# Patient Record
Sex: MALE
[De-identification: names, ages, dates, MRNs, and addresses within clinical notes are randomized per-mention and may not be internally consistent; named-entity substitution may affect disease eponyms.]

---

## 2022-07-20 ENCOUNTER — DASHBOARD ENCOUNTERS (OUTPATIENT)
Age: 37
End: 2022-07-20

## 2022-07-20 ENCOUNTER — LAB OUTSIDE AN ENCOUNTER (OUTPATIENT)
Dept: URBAN - METROPOLITAN AREA CLINIC 111 | Facility: CLINIC | Age: 37
End: 2022-07-20

## 2022-07-20 ENCOUNTER — OFFICE VISIT (OUTPATIENT)
Dept: URBAN - METROPOLITAN AREA CLINIC 111 | Facility: CLINIC | Age: 37
End: 2022-07-20
Payer: SELF-PAY

## 2022-07-20 VITALS
BODY MASS INDEX: 30.98 KG/M2 | DIASTOLIC BLOOD PRESSURE: 80 MMHG | HEART RATE: 53 BPM | WEIGHT: 192.8 LBS | HEIGHT: 66 IN | TEMPERATURE: 97.8 F | SYSTOLIC BLOOD PRESSURE: 122 MMHG

## 2022-07-20 DIAGNOSIS — K59.00 CONSTIPATION: ICD-10-CM

## 2022-07-20 DIAGNOSIS — E66.9 OBESITY (BMI 30.0-34.9): ICD-10-CM

## 2022-07-20 DIAGNOSIS — R19.4 CHANGE IN BOWEL HABITS: ICD-10-CM

## 2022-07-20 DIAGNOSIS — R10.13 DYSPEPSIA: ICD-10-CM

## 2022-07-20 DIAGNOSIS — R12 HEARTBURN: ICD-10-CM

## 2022-07-20 DIAGNOSIS — R14.0 ABDOMINAL BLOATING: ICD-10-CM

## 2022-07-20 DIAGNOSIS — K62.5 RECTAL BLEEDING: ICD-10-CM

## 2022-07-20 PROBLEM — 14760008 CONSTIPATION: Status: ACTIVE | Noted: 2022-07-20

## 2022-07-20 PROBLEM — 414916001 OBESITY: Status: ACTIVE | Noted: 2022-07-20

## 2022-07-20 PROCEDURE — 99204 OFFICE O/P NEW MOD 45 MIN: CPT | Performed by: INTERNAL MEDICINE

## 2022-07-20 NOTE — HPI-TODAY'S VISIT:
- 36 yo  female from [place], self-referred for further evaluation of GI complaints as detailed below, which have been ongoing for approximately 5 years - Has chronic constipation which has gotten worse.  He has bowel movements about 4-5 times a day but they are small in amount and he has to strain a lot.  He also has a sensation of incomplete evacuation.  These symptoms increase his bloating.   - Has rectal bleeding 1-2 times per week, which he attributes to hemorrhoids  - Patient has never had a colonoscopy in the past - Denies hematochezia, change in bowel habits, or weight loss - Denies family history of GI malignancies in any first degree relatives - Notes frequent upper GI symptoms of heartburn, regurgitation and abdominal bloating.  These occur several times per month.  He takes [place]rie[place] 2-3 times per month, which helps. - Does not take ASA or NSAIDs

## 2025-05-12 NOTE — PHYSICAL EXAM NECK/THYROID:
Signed prior authorization faxed to the pharmacy.    normal appearance , without tenderness upon palpation , no deformities , trachea midline , Thyroid normal size , no thyroid nodules , no masses , no JVD , thyroid nontender